# Patient Record
Sex: MALE | Race: WHITE | NOT HISPANIC OR LATINO | Employment: FULL TIME | URBAN - METROPOLITAN AREA
[De-identification: names, ages, dates, MRNs, and addresses within clinical notes are randomized per-mention and may not be internally consistent; named-entity substitution may affect disease eponyms.]

---

## 2018-07-31 ENCOUNTER — APPOINTMENT (OUTPATIENT)
Dept: RADIOLOGY | Facility: MEDICAL CENTER | Age: 23
End: 2018-07-31
Attending: EMERGENCY MEDICINE
Payer: COMMERCIAL

## 2018-07-31 ENCOUNTER — HOSPITAL ENCOUNTER (EMERGENCY)
Facility: MEDICAL CENTER | Age: 23
End: 2018-07-31
Attending: EMERGENCY MEDICINE
Payer: COMMERCIAL

## 2018-07-31 VITALS
OXYGEN SATURATION: 96 % | TEMPERATURE: 97.6 F | SYSTOLIC BLOOD PRESSURE: 99 MMHG | BODY MASS INDEX: 21.38 KG/M2 | DIASTOLIC BLOOD PRESSURE: 68 MMHG | HEIGHT: 67 IN | HEART RATE: 65 BPM | RESPIRATION RATE: 16 BRPM | WEIGHT: 136.24 LBS

## 2018-07-31 DIAGNOSIS — S06.0X0A CONCUSSION WITHOUT LOSS OF CONSCIOUSNESS, INITIAL ENCOUNTER: ICD-10-CM

## 2018-07-31 DIAGNOSIS — S09.90XA CLOSED HEAD INJURY, INITIAL ENCOUNTER: ICD-10-CM

## 2018-07-31 PROCEDURE — 99284 EMERGENCY DEPT VISIT MOD MDM: CPT

## 2018-07-31 PROCEDURE — 70450 CT HEAD/BRAIN W/O DYE: CPT

## 2018-07-31 RX ORDER — ONDANSETRON 4 MG/1
4 TABLET, ORALLY DISINTEGRATING ORAL EVERY 8 HOURS PRN
Qty: 20 TAB | Refills: 0 | Status: SHIPPED | OUTPATIENT
Start: 2018-07-31 | End: 2021-05-04

## 2018-07-31 ASSESSMENT — PAIN SCALES - GENERAL: PAINLEVEL_OUTOF10: 4

## 2018-07-31 NOTE — ED PROVIDER NOTES
"ED Provider Note    Scribed for Jaspal Rojas M.D. by Srinivasa Odell. 7/31/2018  3:10 PM    Means of arrival: Walk-in  History obtained from: Patient  History limited by: None    CHIEF COMPLAINT  Chief Complaint   Patient presents with   • Head Injury     hit head on conveyor belt.    • Headache   • Nausea       HPI  Alden Manrique is a 22 y.o. male who presents to the Emergency Department for evaluation after he stood up and struck his head on a conveyor belt four days ago. He denies loss of consciousness, but experienced fatigue and vision changes. Patient has been nauseous with a headache since the accident. Occupational health requested that he receive a head CT. Patient reports a history of concussions.    REVIEW OF SYSTEMS  Pertinent positives include head injury, fatigue, nausea, vision changes, and headache. Pertinent negatives include no loss of consciousness.    E    PAST MEDICAL HISTORY   Concussions    SURGICAL HISTORY  patient denies any surgical history    SOCIAL HISTORY  Social History   Substance Use Topics   • Smoking status: Former Smoker   • Smokeless tobacco: Never Used   • Alcohol use No      History   Drug Use No       FAMILY HISTORY  History reviewed. No pertinent family history.    CURRENT MEDICATIONS  Home Medications     Reviewed by Ai Pollock R.N. (Registered Nurse) on 07/31/18 at 1507  Med List Status: <None>   Medication Last Dose Status        Patient Nithin Taking any Medications                       ALLERGIES  No Known Allergies    PHYSICAL EXAM  VITAL SIGNS: /72   Pulse (!) 116   Temp 36.4 °C (97.5 °F)   Resp 16   Ht 1.702 m (5' 7\")   Wt 61.8 kg (136 lb 3.9 oz)   SpO2 97%   BMI 21.34 kg/m²     Constitutional: Well developed, Well nourished, No acute distress, Non-toxic appearance.   HENT: Normocephalic, Atraumatic. No hematomas, No hemotympanum. Oropharynx moist.   Eyes: PERRL, EOMI, Conjunctiva normal, No discharge.   CV: Good pulses  Thorax & " Lungs: No respiratory distress.   Skin: Warm, Dry, No erythema, No rash.    Musculoskeletal: No major deformities noted. Paraspinal muscular tenderness, left greater that right. No midline C spine tenderness.  Neurologic: Awake, alert. Moves all extremities spontaneously.  Psychiatric: Affect normal, Mood normal.    LABS  Labs Reviewed - No data to display  All labs reviewed by me.    RADIOLOGY  CT-HEAD W/O   Final Result      No acute intracranial abnormality is identified.        The radiologist's interpretation of all radiological studies have been reviewed by me.    COURSE & MEDICAL DECISION MAKING  Nursing notes, VS, PMSFHx reviewed in chart.    3:10 PM - Patient seen and examined at bedside. Ordered CT head without contrast to evaluate his symptoms.    Decision Making:  Patient with head injury, likely concussion, simple postconcussive syndrome.  Occupational health wanted a CT scan, sent the patient here, CT scan was negative, the patient will follow up with occupational health, have the patient return with any other concerns.     The patient will return for new or worsening symptoms and is stable at the time of discharge.    The patient is referred to a primary physician for blood pressure management, diabetic screening, and for all other preventative health concerns.        DISPOSITION:  Patient will be discharged home in stable condition.    FOLLOW UP:  [facility], Emergency Dept  [address] [id number] [phone]    If symptoms worsen    [facility] [address] [address]  [phone]          OUTPATIENT MEDICATIONS:  Discharge Medication List as of 7/31/2018  4:40 PM      START taking these medications    Details   ondansetron (ZOFRAN ODT) 4 MG TABLET DISPERSIBLE Take 1 Tab by mouth every 8 hours as needed., Disp-20 Tab, R-0, Print Rx Paper               FINAL IMPRESSION  1. Closed head injury, initial encounter     2. Concussion without loss of consciousness, initial encounter          I, Srinivasa Odell (Scribe), am scribing for, and in the presence of, Jaspal Rojas M.D..    Electronically signed by: Srinivasa Odell (Scribe), 7/31/2018    IJaspal M.D. personally performed the services described in this documentation, as scribed by Srinivasa Odell in my presence, and it is both accurate and complete.    The note accurately reflects work and decisions made by me.  Jaspal Rojas  7/31/2018  5:02 PM

## 2018-07-31 NOTE — LETTER
"  FORM C-4:  EMPLOYEE’S CLAIM FOR COMPENSATION/ REPORT OF INITIAL TREATMENT  EMPLOYEE’S CLAIM - PROVIDE ALL INFORMATION REQUESTED   First Name  Alden Last Name  Hilaria Birthdate             Age  1995 22 y.o. Sex  male Claim Number   Home Employee Address  [address]  [facility]                                     Zip  [address] Height  1.702 m (5' 7\") Weight  61.8 kg (136 lb 3.9 oz) [facility]     Mailing Employee Address                           [address]   [place]               Zip  [address] Telephone  [phone] (home)  Primary Language Spoken  ENGLISH   Insurer   Third Party    Employee's Occupation (Job Title) When Injury or Occupational Disease Occurred     Employer's Name  [employer] Telephone  [phone]    Employer Address  [address] [facility] [place] Zip  [address]   Date of Injury  7/29/2018       Hour of Injury  8:00 PM Date Employer Notified  7/29/2018 Last Day of Work after Injury or Occupational Disease  7/29/2018 Supervisor to Whom Injury Reported  Yadiel   Address or Location of Accident (if applicable)  [address]   What were you doing at the time of accident? (if applicable)  Loading packages on conveyor    How did this injury or occupational disease occur? Be specific and answer in detail. Use additional sheet if necessary)  loaded packages onto lower level of conveyor line, and as I came back I lifted my head too fast and hit it on the upper level/   If you believe that you have an occupational disease, when did you first have knowledge of the disability and it relationship to your employment?  n/a Witnesses to the Accident  Emil Ishmeal     Nature of Injury or Occupational Disease  Workers' Compensation  Part(s) of Body Injured or Affected  Skull, N/A, N/A    I certify that the above is true and correct to the best of my knowledge and that I have provided this information in order to " obtain the benefits of [place]  I hereby authorize any physician, chiropractor, surgeon, practitioner, or other person, any hospital, including [facility] hospital, any medical service organization, any insurance company, or other institution or organization to release to each other, any medical or other information, including benefits paid or payable, pertinent to this injury or disease, except information relative to diagnosis, treatment and/or counseling for AIDS, psychological conditions, alcohol or controlled substances, for which I must give specific authorization.  A Photostat of this authorization shall be as valid as the original.   Date  07/31/2018 [facility] Employee’s Signature   THIS REPORT MUST BE COMPLETED AND MAILED WITHIN 3 WORKING DAYS OF TREATMENT   Place  [facility], EMERGENCY DEPT  Name of Facility   [facility]   Date  7/31/2018 Diagnosis  (S09.90XA) Closed head injury, initial encounter  (S06.0X0A) Concussion without loss of consciousness, initial encounter Is there evidence the injured employee was under the influence of alcohol and/or another controlled substance at the time of accident?   Hour  4:35 PM Description of Injury or Disease  Closed head injury, initial encounter  Concussion without loss of consciousness, initial encounter No   Treatment  none  Have you advised the patient to remain off work five days or more?         No   X-Ray Findings  Negative   If Yes   From Date    To Date      From information given by the employee, together with medical evidence, can you directly connect this injury or occupational disease as job incurred?  Yes If No, is the employee capable of: Full Duty  No Modified Duty  No   Is additional medical care by a physician indicated?  Yes If Modified Duty, Specify  "any Limitations / Restrictions        Do you know of any previous injury or disease contributing to this condition or occupational disease?  No   Date  7/31/2018 Print Doctor’s Name  Jaspal Rojas I certify the employer’s copy of this form was mailed on:   Address  [address] [address] [phone] Insurer’s Use Only   [facility]  [address]    Provider’s Tax ID Number  [id number] Telephone  Dept: [phone]    Doctor’s Signature  e-JASPAL Adam M.D. Degree   M.D.    Original - TREATING PHYSICIAN OR CHIROPRACTOR   Pg 2-Insurer/TPA   Pg 3-Employer   Pg 4-Employee                                                                                                  Form C-4 (rev01/03)   BRIEF DESCRIPTION OF RIGHTS AND BENEFITS  (Pursuant to [place] 616C.050)  Notice of Injury or Occupational Disease (Incident Report Form C-1): If an injury or occupational disease (OD) arises out of and in the course of employment, you must provide written notice to your employer as soon as practicable, but no later than 7 days after the accident or OD. Your employer shall maintain a sufficient supply of the required forms.  Claim for Compensation (Form C-4): If medical treatment is sought, the form C-4 is available at the place of initial treatment. A completed \"Claim for Compensation\" (Form C-4) must be filed within 90 days after an accident or OD. The treating physician or chiropractor must, within 3 working days after treatment, complete and mail to the employer, the employer's insurer and third-party , the Claim for Compensation.  Medical Treatment: If you require medical treatment for your on-the-job injury or OD, you may be required to select a physician or chiropractor from a list provided by your workers’ compensation insurer, if it has contracted with an Organization for Managed Care (MCO) or Preferred Provider Organization (PPO) or providers of health care. If your " employer has not entered into a contract with an MCO or PPO, you may select a physician or chiropractor from the Panel of Physicians and Chiropractors. Any medical costs related to your industrial injury or OD will be paid by your insurer.  Temporary Total Disability (TTD): If your doctor has certified that you are unable to work for a period of at least 5 consecutive days, or 5 cumulative days in a 20-day period, or places restrictions on you that your employer does not accommodate, you may be entitled to TTD compensation.  Temporary Partial Disability (TPD): If the wage you receive upon reemployment is less than the compensation for TTD to which you are entitled, the insurer may be required to pay you TPD compensation to make up the difference. TPD can only be paid for a maximum of 24 months.  Permanent Partial Disability (PPD): When your medical condition is stable and there is an indication of a PPD as a result of your injury or OD, within 30 days, your insurer must arrange for an evaluation by a rating physician or chiropractor to determine the degree of your PPD. The amount of your PPD award depends on the date of injury, the results of the PPD evaluation and your age and wage.  Permanent Total Disability (PTD): If you are medically certified by a treating physician or chiropractor as permanently and totally disabled and have been granted a PTD status by your insurer, you are entitled to receive monthly benefits not to exceed 66 2/3% of your average monthly wage. The amount of your PTD payments is subject to reduction if you previously received a PPD award.  ocational Rehabilitation Services: You may be eligible for vocational rehabilitation services if you are unable to return to the job due to a permanent physical impairment or permanent restrictions as a result of your injury or occupational disease.  Transportation and Per April Reimbursement: You may be eligible for travel expenses and per april associated  with medical treatment.  Reopening: You may be able to reopen your claim if your condition worsens after claim closure.  Appeal Process: If you disagree with a written determination issued by the insurer or the insurer does not respond to your request, you may appeal to the Department of Administration, , by following the instructions contained in your determination letter. You must appeal the determination within 70 days from the date of the determination letter at [address], [address], [place], [place] [address], or [address], [address], [place], [place] [address]. If you disagree with the  decision, you may appeal to the Department of Administration, . You must file your appeal within 30 days from the date of the  decision letter at [address], [address], [place], [place] [address], or [address], [place], [place] [address]. If you disagree with a decision of an , you may file a petition for judicial review with the District Court. You must do so within 30 days of the Appeal Officer’s decision. You may be represented by an  at your own expense or you may contact the [facility] for possible representation.  [place]  for Injured Workers (NAIW): If you disagree with a  decision, you may request that NAIW represent you without charge at an  Hearing. For information regarding denial of benefits, you may contact the [facility] at: [address], [address], [place] [address], [phone], or [address], [address], [place] [address], [phone]  To File a Complaint with the Division: If you wish to file a complaint with the  of the Division of Industrial Relations (DIR), please contact the Workers’ Compensation Section, [address], [address], [place], [place] [address], telephone [phone], or [address], [address], [place], [place] [address], telephone [phone]) [phone].  For assistance with Workers’ Compensation Issues: you may contact the Office of the Governor Consumer Health Assistance, [address] [place] [address], [address], [place], [place] [address], Toll Free 1-406.428.2344, Web site: [email or web address]., E-mail [email or web address].                                                                                                                                                                               __________________________________________________________________                                    _07/31/2018_            Employee Name / Signature                                                                                                                            Date                                       D-2 (rev. 10/07)

## 2018-07-31 NOTE — ED TRIAGE NOTES
.  Chief Complaint   Patient presents with   • Head Injury     hit head on conveyor belt.    • Headache   • Nausea     Intermittent headaches, nausea when headaches start. Sent by work for CT d/t continued pain. Injury occurred Friday. No loc.

## 2018-07-31 NOTE — ED NOTES
Pt admits readiness for discharge.  Patient given discharge instructions and verbalized understanding.  Vital signs are stable.  Pt denies any further needs.

## 2018-07-31 NOTE — ED NOTES
Pt ambs to room, reports that because he still had pain he was directed to occupational health for eval today and they stated pt need to go to ER for CT scan. Reports occasional nausea, no vomiting, no dizziness. MERVAT. Chart up for ERP

## 2020-11-20 ENCOUNTER — HOSPITAL ENCOUNTER (OUTPATIENT)
Dept: LAB | Facility: MEDICAL CENTER | Age: 25
End: 2020-11-20
Attending: FAMILY MEDICINE
Payer: COMMERCIAL

## 2020-11-20 PROCEDURE — C9803 HOPD COVID-19 SPEC COLLECT: HCPCS

## 2020-11-20 PROCEDURE — U0003 INFECTIOUS AGENT DETECTION BY NUCLEIC ACID (DNA OR RNA); SEVERE ACUTE RESPIRATORY SYNDROME CORONAVIRUS 2 (SARS-COV-2) (CORONAVIRUS DISEASE [COVID-19]), AMPLIFIED PROBE TECHNIQUE, MAKING USE OF HIGH THROUGHPUT TECHNOLOGIES AS DESCRIBED BY CMS-2020-01-R: HCPCS

## 2020-11-21 LAB — COVID ORDER STATUS COVID19: NORMAL

## 2020-11-22 LAB
SARS-COV-2 RNA RESP QL NAA+PROBE: NOTDETECTED
SPECIMEN SOURCE: NORMAL

## 2021-01-20 ENCOUNTER — APPOINTMENT (OUTPATIENT)
Dept: RADIOLOGY | Facility: MEDICAL CENTER | Age: 26
End: 2021-01-20
Attending: EMERGENCY MEDICINE
Payer: COMMERCIAL

## 2021-01-20 ENCOUNTER — HOSPITAL ENCOUNTER (EMERGENCY)
Facility: MEDICAL CENTER | Age: 26
End: 2021-01-20
Attending: EMERGENCY MEDICINE
Payer: COMMERCIAL

## 2021-01-20 VITALS
DIASTOLIC BLOOD PRESSURE: 64 MMHG | WEIGHT: 146.83 LBS | RESPIRATION RATE: 18 BRPM | HEART RATE: 84 BPM | HEIGHT: 67 IN | TEMPERATURE: 99.8 F | OXYGEN SATURATION: 98 % | BODY MASS INDEX: 23.04 KG/M2 | SYSTOLIC BLOOD PRESSURE: 119 MMHG

## 2021-01-20 DIAGNOSIS — S20.229A: ICD-10-CM

## 2021-01-20 DIAGNOSIS — S06.0X0A CONCUSSION WITHOUT LOSS OF CONSCIOUSNESS, INITIAL ENCOUNTER: ICD-10-CM

## 2021-01-20 DIAGNOSIS — S10.93XA CONTUSION OF NECK, INITIAL ENCOUNTER: ICD-10-CM

## 2021-01-20 PROCEDURE — 99283 EMERGENCY DEPT VISIT LOW MDM: CPT

## 2021-01-20 PROCEDURE — 72040 X-RAY EXAM NECK SPINE 2-3 VW: CPT

## 2021-01-20 PROCEDURE — 72070 X-RAY EXAM THORAC SPINE 2VWS: CPT

## 2021-01-20 RX ORDER — ACETAMINOPHEN 500 MG
1000 TABLET ORAL EVERY 6 HOURS PRN
COMMUNITY

## 2021-01-20 NOTE — Clinical Note
Alden Manrique was seen and treated in our emergency department on 1/20/2021.  He may return to work on 01/21/2021.  Limited weight lifting for one week,  no more than 10 lbs      If you have any questions or concerns, please don't hesitate to call.      Ariadne Griffin M.D.

## 2021-01-21 NOTE — ED TRIAGE NOTES
Tripped and hit shelf with head last night at store. No LOC No vomiting. C/o neck pain and had loss of vision for a few seconds last night  Hard C collar placed in triage.

## 2021-01-21 NOTE — ED NOTES
Med rec updated and complete  Allergies reviewed  Pt reports no prescription medications or vitamins    Pt reports no antibiotics in the last 2 weeks

## 2021-01-21 NOTE — ED NOTES
"Pt. Reports slip and fall last night, hitting his head on a shelf in a store. Denies LOC, but states \"I went home and had some conversations with my girlfriend that I don't remember, that's why I came in\". Denies vomiting, or numbness/tingling to extremities. Denies other injury at this time.   "

## 2021-01-21 NOTE — ED PROVIDER NOTES
"ED Provider Note    CHIEF COMPLAINT  Chief Complaint   Patient presents with   • Head Injury     Tripped and head hit shelf in store  No LOC No vomiting  Pain to base of neck       HPI  Alden Manrique is a 25 y.o. male who presents to the emergency department approximately 24 hours after traumatic event.  The patient states he tripped and hit his head into a very heavy shelf in the store and also hit his upper back and upper neck.  He did not lose consciousness he woke up with a mild headache today but no dizziness nausea vomiting and the headache resolved Tylenol.  Patient is concerned as he had continued pain in his lower neck and upper back that radiates to both shoulder blades.  There is no associated weakness numbness or tingling but his girlfriend insisted he come in and get checked.  He is currently about a 4 out of 10 worse with movement    REVIEW OF SYSTEMS  Positives as above. Pertinent negatives include loss of consciousness nausea vomiting weakness numbness tingling easy bleeding or bruising chest pain shortness of breath  All other review of systems are negative    PAST MEDICAL HISTORY       SOCIAL HISTORY  Social History     Tobacco Use   • Smoking status: Former Smoker   • Smokeless tobacco: Never Used   Substance and Sexual Activity   • Alcohol use: No   • Drug use: No   • Sexual activity: Not on file       SURGICAL HISTORY  patient denies any surgical history    CURRENT MEDICATIONS  Home Medications     Reviewed by Ortega Roach (Pharmacy Tech) on 01/20/21 at 1706  Med List Status: Complete   Medication Last Dose Status   acetaminophen (TYLENOL) 500 MG Tab 1/19/2021 Active   ondansetron (ZOFRAN ODT) 4 MG TABLET DISPERSIBLE Not Taking Active                ALLERGIES  Allergies   Allergen Reactions   • Trace Metals Rash     Rash       PHYSICAL EXAM  VITAL SIGNS: /73   Pulse 91   Temp 37.7 °C (99.8 °F) (Temporal)   Resp 16   Ht 1.702 m (5' 7\")   Wt 66.6 kg (146 lb 13.2 oz)   " SpO2 96%   BMI 23.00 kg/m²    Pulse ox interpretation: I interpret this pulse ox as normal.  Constitutional: Alert in no apparent distress.  HENT: Normocephalic, Atraumatic, MMM, no scalp contusions, midline cervical mild tenderness at the base of the C-spine that is midline without step-offs or swelling and at the top of the T-spine mild bilateral muscular tenderness  Eyes: PERound. Conjunctiva normal, non-icteric.   Heart: Regular rate and rhythm, no murmurs.    Lungs: Clear to auscultation bilaterally. No resp distress, breath sounds equal  Abdomen: Non-tender, non-distended, normal bowel sounds  Skin: Warm, Dry, No erythema, No rash.   Neurologic: Alert and oriented, Symmetric smile, eyes shut tight bilaterally, forehead wrinkles bilaterally, sensation intact to light touch bilateral face, tongue midline, head turn and shoulder shrug with full strength. Hearing intact grossly bilaterally. 5/5  strength bilaterally, 5/5 tricep and bicep strength bilaterally. Sensation intact to light touch r, m, u, axillary nerves bilaterally. 5/5 strength quadricep, plantarflexion/dorsiflexion/extensor hallicus longus bilaterally. Sensation intact to light touch bilateral lower extremities in all nerve distributions.  Intact finger-to-nose, negative rhomberg, no pronator drift        DIFFERENTIAL DIAGNOSIS AND WORK UP PLAN    This is a 25 y.o. male who presents with mostly upper neck and upper back pain after a traumatic event yesterday was in a fall from a great height his head is currently cleared by [facility] criteria but does have some midline tenderness he was placed in a collar on arrival for safety most likely anticipate bone contusion and strain or sprain.  He does not wish for anything for pain at this moment we will perform some plain films    Pertinent Lab Findings  Labs Reviewed - No data to display    Radiology  DX-CERVICAL SPINE-2 OR 3 VIEWS   Final Result         No acute fracture or malalignment.     "  DX-THORACIC SPINE-2 VIEWS   Final Result         No acute osseous abnormality.        The radiologist's interpretation of all radiological studies have been reviewed by me.    COURSE & MEDICAL DECISION MAKING  Pertinent Labs & Imaging studies reviewed. (See chart for details)    5:40 PM  I reassessed patient the bedside is resting comfortably we discussed rice treatment at home Tylenol and NSAIDs as needed light weightlifting and work as he states he lifts very heavy things in a regular basis until his pain is improved.  Return to the ED for any new or worsening issues he understands feels comfortable at home.    /64   Pulse 84   Temp 37.7 °C (99.8 °F) (Temporal)   Resp 18   Ht 1.702 m (5' 7\")   Wt 66.6 kg (146 lb 13.2 oz)   SpO2 98%   BMI 23.00 kg/m²       I verified that the patient was wearing a mask and I was wearing appropriate PPE every time I entered the room. The patient's mask was on the patient at all times during my encounter except for a brief view of the oropharynx.    The patient will return for new or worsening symptoms and is stable at the time of discharge.    The patient is referred to a primary physician for blood pressure management, diabetic screening, and for all other preventative health concerns.    DISPOSITION:  Patient will be discharged home in stable condition.    FOLLOW UP:  [facility], Emergency Dept  [address]  [place] [address]  [phone]    If symptoms worsen      OUTPATIENT MEDICATIONS:  Discharge Medication List as of 1/20/2021  5:51 PM            FINAL IMPRESSION  1. Concussion without loss of consciousness, initial encounter     2. Contusion of neck, initial encounter     3. Contusion of upper back, initial encounter                Electronically signed by: Ariadne Griffin M.D., 1/20/2021 5:11 PM    This dictation has been created using voice recognition software and/or scribes. The accuracy of the dictation is limited " by the abilities of the software and the expertise of the scribes. I expect there may be some errors of grammar and possibly content. I made every attempt to manually correct the errors within my dictation. However, errors related to voice recognition software and/or scribes may still exist and should be interpreted within the appropriate context.

## 2021-05-04 ENCOUNTER — OFFICE VISIT (OUTPATIENT)
Dept: URGENT CARE | Facility: CLINIC | Age: 26
End: 2021-05-04
Payer: COMMERCIAL

## 2021-05-04 VITALS
DIASTOLIC BLOOD PRESSURE: 90 MMHG | RESPIRATION RATE: 16 BRPM | HEART RATE: 78 BPM | HEIGHT: 66 IN | OXYGEN SATURATION: 97 % | BODY MASS INDEX: 24.11 KG/M2 | WEIGHT: 150 LBS | TEMPERATURE: 98.5 F | SYSTOLIC BLOOD PRESSURE: 128 MMHG

## 2021-05-04 DIAGNOSIS — J30.2 CHRONIC SEASONAL ALLERGIC RHINITIS: ICD-10-CM

## 2021-05-04 DIAGNOSIS — J01.40 ACUTE NON-RECURRENT PANSINUSITIS: ICD-10-CM

## 2021-05-04 PROCEDURE — 99204 OFFICE O/P NEW MOD 45 MIN: CPT | Performed by: PHYSICIAN ASSISTANT

## 2021-05-04 RX ORDER — AMOXICILLIN AND CLAVULANATE POTASSIUM 875; 125 MG/1; MG/1
1 TABLET, FILM COATED ORAL 2 TIMES DAILY
Qty: 14 TABLET | Refills: 0 | Status: SHIPPED | OUTPATIENT
Start: 2021-05-04 | End: 2021-05-11

## 2021-05-04 RX ORDER — ALBUTEROL SULFATE 90 UG/1
2 AEROSOL, METERED RESPIRATORY (INHALATION) EVERY 6 HOURS PRN
Qty: 8.5 G | Refills: 0 | Status: SHIPPED | OUTPATIENT
Start: 2021-05-04

## 2021-05-04 ASSESSMENT — ENCOUNTER SYMPTOMS
COUGH: 1
HOARSE VOICE: 1
SINUS PRESSURE: 1
SHORTNESS OF BREATH: 1
CHILLS: 0

## 2021-05-04 NOTE — PROGRESS NOTES
"Subjective:   Alden Manrique is a 25 y.o. male who presents for Seasonal Allergies (x 2 days ), Shortness of Breath, and Headache        Sinusitis  This is a new problem. Episode onset: 2-3 weeks  The problem is unchanged. There has been no fever. Associated symptoms include congestion, coughing, a hoarse voice, shortness of breath (intermittent) and sinus pressure. Pertinent negatives include no chills. Treatments tried: zyretc      Hx of chronic seasonal allergies that cause asthma symptoms including wheezing, dry cough, SOB. He has not been dx with asthma previously but has been treated with albuterol inhaler with resolution of sx.     Review of Systems   Constitutional: Negative for chills.   HENT: Positive for congestion, hoarse voice and sinus pressure.    Respiratory: Positive for cough and shortness of breath (intermittent).        PMH:  has no past medical history on file.  MEDS:   Current Outpatient Medications:   •  amoxicillin-clavulanate (AUGMENTIN) 875-125 MG Tab, Take 1 tablet by mouth 2 times a day for 7 days., Disp: 14 tablet, Rfl: 0  •  albuterol 108 (90 Base) MCG/ACT Aero Soln inhalation aerosol, Inhale 2 Puffs every 6 hours as needed for Shortness of Breath., Disp: 8.5 g, Rfl: 0  •  acetaminophen (TYLENOL) 500 MG Tab, Take 1,000 mg by mouth every 6 hours as needed for Moderate Pain., Disp: , Rfl:   ALLERGIES:   Allergies   Allergen Reactions   • Trace Metals Rash     Rash     SURGHX: History reviewed. No pertinent surgical history.  SOCHX:  reports that he has quit smoking. He has never used smokeless tobacco. He reports current alcohol use. He reports that he does not use drugs.  History reviewed. No pertinent family history.     Objective:   /90   Pulse 78   Temp 36.9 °C (98.5 °F) (Temporal)   Resp 16   Ht 1.676 m (5' 6\")   Wt 68 kg (150 lb)   SpO2 97%   BMI 24.21 kg/m²     Physical Exam  Vitals reviewed.   Constitutional:       General: He is not in acute distress.     " Appearance: Normal appearance. He is well-developed. He is not ill-appearing.   HENT:      Head: Normocephalic and atraumatic.      Right Ear: Tympanic membrane and external ear normal.      Left Ear: Tympanic membrane and external ear normal.      Nose: Congestion present.      Mouth/Throat:      Mouth: Mucous membranes are moist.      Pharynx: Posterior oropharyngeal erythema present.   Eyes:      Conjunctiva/sclera: Conjunctivae normal.      Pupils: Pupils are equal, round, and reactive to light.   Neck:      Trachea: No tracheal deviation.   Cardiovascular:      Rate and Rhythm: Normal rate and regular rhythm.   Pulmonary:      Effort: Pulmonary effort is normal. No respiratory distress.      Breath sounds: Normal breath sounds. No stridor. No wheezing or rhonchi.   Musculoskeletal:      Cervical back: Normal range of motion and neck supple. No rigidity.   Lymphadenopathy:      Cervical: No cervical adenopathy.   Skin:     General: Skin is warm and dry.      Capillary Refill: Capillary refill takes less than 2 seconds.   Neurological:      General: No focal deficit present.      Mental Status: He is alert and oriented to person, place, and time.   Psychiatric:         Mood and Affect: Mood normal.         Behavior: Behavior normal.           Assessment/Plan:     1. Acute non-recurrent pansinusitis  amoxicillin-clavulanate (AUGMENTIN) 875-125 MG Tab   2. Chronic seasonal allergic rhinitis  albuterol 108 (90 Base) MCG/ACT Aero Soln inhalation aerosol     Supportive care reviewed. Start zyrtec-d, flonase, augmentin. He was given a refill for albuterol. We discussed importance of f/u with pcp.        If symptoms worsen or persist patient can return to clinic for reevaluation.  Red flags and emergency room precautions discussed. Side effects of medication discussed. Patient confirmed understanding of information.    Please note that this dictation was created using voice recognition software. I have made every  reasonable attempt to correct obvious errors, but I expect that there are errors of grammar and possibly content that I did not discover before finalizing the note.

## 2021-05-04 NOTE — LETTER
May 4, 2021         Patient: Alden Manrique   YOB: 1995   Date of Visit: 5/4/2021           To Whom it May Concern:    Alden Manrique was seen in my clinic on 5/4/2021. He may return to work on 5/6/21 or sooner if symptoms improve.    If you have any questions or concerns, please don't hesitate to call.        Sincerely,           Ai Olguin P.A.-C.  Electronically Signed

## 2021-11-17 ENCOUNTER — OFFICE VISIT (OUTPATIENT)
Dept: URGENT CARE | Facility: CLINIC | Age: 26
End: 2021-11-17
Payer: COMMERCIAL

## 2021-11-17 VITALS
SYSTOLIC BLOOD PRESSURE: 110 MMHG | BODY MASS INDEX: 25.9 KG/M2 | WEIGHT: 165 LBS | HEIGHT: 67 IN | TEMPERATURE: 98.5 F | OXYGEN SATURATION: 97 % | HEART RATE: 75 BPM | RESPIRATION RATE: 16 BRPM | DIASTOLIC BLOOD PRESSURE: 64 MMHG

## 2021-11-17 DIAGNOSIS — R10.9 BELLY PAIN: ICD-10-CM

## 2021-11-17 LAB
APPEARANCE UR: CLEAR
BILIRUB UR STRIP-MCNC: NORMAL MG/DL
COLOR UR AUTO: YELLOW
GLUCOSE UR STRIP.AUTO-MCNC: NORMAL MG/DL
KETONES UR STRIP.AUTO-MCNC: NORMAL MG/DL
LEUKOCYTE ESTERASE UR QL STRIP.AUTO: NORMAL
NITRITE UR QL STRIP.AUTO: NORMAL
PH UR STRIP.AUTO: 5.5 [PH] (ref 5–8)
PROT UR QL STRIP: NORMAL MG/DL
RBC UR QL AUTO: NORMAL
SP GR UR STRIP.AUTO: 1.02
UROBILINOGEN UR STRIP-MCNC: 0.2 MG/DL

## 2021-11-17 PROCEDURE — 99213 OFFICE O/P EST LOW 20 MIN: CPT | Performed by: FAMILY MEDICINE

## 2021-11-17 PROCEDURE — 81002 URINALYSIS NONAUTO W/O SCOPE: CPT | Performed by: FAMILY MEDICINE

## 2021-11-17 RX ORDER — HYOSCYAMINE SULFATE 0.125 MG
125 TABLET ORAL EVERY 6 HOURS PRN
Qty: 15 TABLET | Refills: 0 | Status: SHIPPED | OUTPATIENT
Start: 2021-11-17 | End: 2023-07-27

## 2021-11-17 ASSESSMENT — ENCOUNTER SYMPTOMS
ABDOMINAL PAIN: 1
BACK PAIN: 1

## 2021-11-17 NOTE — LETTER
November 17, 2021         Patient: Alden Manrique   YOB: 1995   Date of Visit: 11/17/2021           To Whom it May Concern:    Alden Manrique was seen in my clinic on 11/17/2021. He may return to work tomorrow.    If you have any questions or concerns, please don't hesitate to call.        Sincerely,           Mor Castorena M.D.  Electronically Signed

## 2021-11-17 NOTE — PROGRESS NOTES
"Subjective     Alden Manrique is a 26 y.o. male who presents with LLQ Pain (sharp/throbbing pain, worse when standing, started yesterday midday ) and Low Back Pain (Left lower )    - This is a pleasant and nontoxic appearing 26 y.o. male with c/o yesterday noticed some pain to LLQ region. Has been mild and on/off, feels like gas. A little pain Lt lower back. No NVFC, eating/drinking well, no stool changes, no blood stools, no urinary symptoms. Pain isnt present at moment now. Some OTC gas ex yesterday seemed to help.       ALLERGIES:  Trace metals     PMH:  History reviewed. No pertinent past medical history.     PSH:  History reviewed. No pertinent surgical history.    MEDS:    Current Outpatient Medications:   •  Loratadine (CLARITIN PO), Take  by mouth., Disp: , Rfl:   •  hyoscyamine (LEVSIN) 0.125 MG tablet, Take 1 Tablet by mouth every 6 hours as needed for Cramping., Disp: 15 Tablet, Rfl: 0  •  albuterol 108 (90 Base) MCG/ACT Aero Soln inhalation aerosol, Inhale 2 Puffs every 6 hours as needed for Shortness of Breath., Disp: 8.5 g, Rfl: 0  •  acetaminophen (TYLENOL) 500 MG Tab, Take 1,000 mg by mouth every 6 hours as needed for Moderate Pain., Disp: , Rfl:     ** I have documented what I find to be significant in regards to past medical, social, family and surgical history  in my HPI or under PMH/PSH/FH review section, otherwise it is noncontributory **             HPI    Review of Systems   Gastrointestinal: Positive for abdominal pain.   Musculoskeletal: Positive for back pain.   All other systems reviewed and are negative.      Objective     /64   Pulse 75   Temp 36.9 °C (98.5 °F) (Temporal)   Resp 16   Ht 1.702 m (5' 7\")   Wt 74.8 kg (165 lb)   SpO2 97%   BMI 25.84 kg/m²      Physical Exam  Vitals and nursing note reviewed.   Constitutional:       General: He is not in acute distress.     Appearance: Normal appearance. He is well-developed.   HENT:      Head: Normocephalic and atraumatic. "      Mouth/Throat:      Mouth: Mucous membranes are moist.      Pharynx: Oropharynx is clear.   Eyes:      General: No scleral icterus.  Cardiovascular:      Heart sounds: Normal heart sounds. No murmur heard.      Pulmonary:      Effort: Pulmonary effort is normal. No respiratory distress.   Abdominal:      General: Abdomen is flat. Bowel sounds are normal. There is no distension.      Palpations: Abdomen is soft.      Tenderness: There is abdominal tenderness ( very mild LLQ). There is no guarding or rebound.   Skin:     Coloration: Skin is not jaundiced or pale.   Neurological:      Mental Status: He is alert.      Motor: No abnormal muscle tone.   Psychiatric:         Mood and Affect: Mood normal.         Behavior: Behavior normal.           Assessment & Plan       1. Belly pain  POCT Urinalysis    hyoscyamine (LEVSIN) 0.125 MG tablet     * nonspecific, trial of above and liquid diet today. If not improving in 24hrs or feels is getting worse then go to ER      - Dx, plan & d/c instructions discussed   - Rest, stay hydrated,   - E.R. precautions discussed     Asked to kindly follow up with their PCP's office in 2-3 days for a recheck, ER if not improving or feeling/getting worse.    Any realistic side effects of medications that may have been given today reviewed.     Patient left in stable condition     POCT results reviewed/discussed

## 2023-05-24 ENCOUNTER — OCCUPATIONAL MEDICINE (OUTPATIENT)
Dept: URGENT CARE | Facility: CLINIC | Age: 28
End: 2023-05-24
Payer: COMMERCIAL

## 2023-05-24 VITALS
SYSTOLIC BLOOD PRESSURE: 120 MMHG | TEMPERATURE: 98.3 F | HEIGHT: 66 IN | BODY MASS INDEX: 27.48 KG/M2 | WEIGHT: 171 LBS | RESPIRATION RATE: 18 BRPM | OXYGEN SATURATION: 96 % | HEART RATE: 58 BPM | DIASTOLIC BLOOD PRESSURE: 60 MMHG

## 2023-05-24 DIAGNOSIS — S00.83XA CONTUSION OF FACE, INITIAL ENCOUNTER: ICD-10-CM

## 2023-05-24 DIAGNOSIS — S05.01XA CONJUNCTIVAL ABRASION, RIGHT, INITIAL ENCOUNTER: ICD-10-CM

## 2023-05-24 DIAGNOSIS — S00.531A CONTUSION OF LIP, INITIAL ENCOUNTER: ICD-10-CM

## 2023-05-24 DIAGNOSIS — Z02.6 ENCOUNTER RELATED TO WORKER'S COMPENSATION CLAIM: ICD-10-CM
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
## 2023-05-24 PROCEDURE — 99214 OFFICE O/P EST MOD 30 MIN: CPT | Performed by: PHYSICIAN ASSISTANT

## 2023-05-24 RX ORDER — OFLOXACIN 3 MG/ML
1 SOLUTION/ DROPS OPHTHALMIC 4 TIMES DAILY
Qty: 10 ML | Refills: 0 | Status: SHIPPED | OUTPATIENT
Start: 2023-05-24 | End: 2023-07-27
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
## 2023-07-27 ENCOUNTER — TELEMEDICINE (OUTPATIENT)
Dept: TELEHEALTH | Facility: TELEMEDICINE | Age: 28
End: 2023-07-27
Payer: COMMERCIAL

## 2023-07-27 DIAGNOSIS — J02.9 PHARYNGITIS, UNSPECIFIED ETIOLOGY: ICD-10-CM

## 2023-07-27 DIAGNOSIS — J01.90 ACUTE NON-RECURRENT SINUSITIS, UNSPECIFIED LOCATION: ICD-10-CM

## 2023-07-27 PROCEDURE — 99213 OFFICE O/P EST LOW 20 MIN: CPT | Mod: 95 | Performed by: NURSE PRACTITIONER

## 2023-07-27 NOTE — LETTER
July 27, 2023         Patient: Alden Manrique   YOB: 1995   Date of Visit: 7/27/2023           To Whom it May Concern:    Alden Manrique was seen in my clinic on 7/27/2023. He may return to work on 07/28/2023.    If you have any questions or concerns, please don't hesitate to call.        Sincerely,           BRIAN Hector.  Electronically Signed

## 2023-07-27 NOTE — PATIENT INSTRUCTIONS
-Nasal spray and allergy medications as directed (Zyrtec or Loratadine).  -You may try saline irrigation or neti pot.   -Drink plenty of fluids.  -Ibuprofen or Tylenol as directed for pain.   -Warm compress to sinuses.  -Warm salt water gargles.  -OTC Throat lozenges or spray (Cepacol).    Follow up with primary care provider. Urgently for worsening symptoms, persistent throat pain,, difficulty swallowing, shortness of breath, persistent fevers, facial swelling, visual changes, weakness, elevated heart rate, stiff neck, symptoms last longer than 10 days, or any other concerns.

## 2023-07-27 NOTE — PROGRESS NOTES
Virtual Visit: Established Patient   This visit was conducted via Zoom using secure and encrypted videoconferencing technology.   The patient was in their home in the [facility].    The patient's identity was confirmed and verbal consent was obtained for this virtual visit.    Subjective:   CC:   Chief Complaint   Patient presents with    Pharyngitis    Sinusitis     Alden Manrique is a 27 y.o. male presenting for evaluation and management of:    Started 2 days ago. Has a history of annual nasal congestion and sinus issues; associated with allergies. Low grade fever yesterday. No body aches or chills. Migraine headache behind the eyes with pressure. Denies cough. Post nasal drip. Mild sore throat. Nausea. No diarrhea or vomiting. Taking Zyrtec. Missed work today. Exposed to strep at work. Negative home covid test this morning.  Needs a work note.    ROS   Note HPI. Environmental allergies.     Current medicines (including changes today)  Current Outpatient Medications   Medication Sig Dispense Refill    albuterol 108 (90 Base) MCG/ACT Aero Soln inhalation aerosol Inhale 2 Puffs every 6 hours as needed for Shortness of Breath. 8.5 g 0    acetaminophen (TYLENOL) 500 MG Tab Take 1,000 mg by mouth every 6 hours as needed for Moderate Pain.       No current facility-administered medications for this visit.       There are no problems to display for this patient.       Objective:   There were no vitals taken for this visit.  RR 16    Physical Exam:  Constitutional: Alert, no distress, well-groomed.  Skin: No rashes in visible areas.  Eye: Round. Conjunctiva clear, lids normal. No icterus.   ENMT: Nasal congestion. Lips pink. Clear speech.  Respiratory: Unlabored respiratory effort, no cough or audible wheeze  Psych: Alert and oriented x3, normal affect and mood.     Assessment and Plan:   The following treatment plan was discussed:     1. Acute non-recurrent sinusitis, unspecified location    2. Pharyngitis,  unspecified etiology  - POCT CEPHEID GROUP A STREP - PCR    -Nasal spray and allergy medications as directed (Zyrtec or Loratadine).  -You may try saline irrigation or neti pot.   -Drink plenty of fluids.  -Ibuprofen or Tylenol as directed for pain.   -Warm compress to sinuses.  -Warm salt water gargles.  -OTC Throat lozenges or spray (Cepacol).    Follow up with primary care provider. Urgently for worsening symptoms, persistent throat pain,, difficulty swallowing, shortness of breath, persistent fevers, facial swelling, visual changes, weakness, elevated heart rate, stiff neck, symptoms last longer than 10 days, or any other concerns.    Follow-up: Return if symptoms worsen or fail to improve.    Acute symptoms x 2 days. Had negative home COVID testing. Hx of sinusitis. Likely viral etiology, with allergy contribution. Advised continued symptomatic care, with f/u for persistent or worsening symptoms.

## 2023-11-15 ENCOUNTER — APPOINTMENT (OUTPATIENT)
Dept: TELEHEALTH | Facility: TELEMEDICINE | Age: 28
End: 2023-11-15

## 2023-11-15 ENCOUNTER — APPOINTMENT (OUTPATIENT)
Dept: URGENT CARE | Facility: CLINIC | Age: 28
End: 2023-11-15

## 2023-11-17 ENCOUNTER — OFFICE VISIT (OUTPATIENT)
Dept: URGENT CARE | Facility: CLINIC | Age: 28
End: 2023-11-17

## 2023-11-17 VITALS
TEMPERATURE: 97.6 F | RESPIRATION RATE: 16 BRPM | HEART RATE: 70 BPM | BODY MASS INDEX: 26.68 KG/M2 | DIASTOLIC BLOOD PRESSURE: 80 MMHG | WEIGHT: 170 LBS | HEIGHT: 67 IN | SYSTOLIC BLOOD PRESSURE: 120 MMHG | OXYGEN SATURATION: 97 %

## 2023-11-17 DIAGNOSIS — U07.1 COVID-19 VIRUS INFECTION: ICD-10-CM

## 2023-11-17 PROCEDURE — 3074F SYST BP LT 130 MM HG: CPT | Performed by: PHYSICIAN ASSISTANT

## 2023-11-17 PROCEDURE — 99213 OFFICE O/P EST LOW 20 MIN: CPT | Performed by: PHYSICIAN ASSISTANT

## 2023-11-17 PROCEDURE — 3079F DIAST BP 80-89 MM HG: CPT | Performed by: PHYSICIAN ASSISTANT

## 2023-11-17 ASSESSMENT — ENCOUNTER SYMPTOMS
VOMITING: 1
CHILLS: 1
SORE THROAT: 1
MYALGIAS: 1
COUGH: 1
SHORTNESS OF BREATH: 0
FEVER: 1
HEADACHES: 1
NAUSEA: 1

## 2023-11-17 NOTE — PROGRESS NOTES
Subjective     Hamlet Manrique is a 28 y.o. male who presents with Cough (X 7 days, cough, tested positive for covid on Monday, requesting a work note.)    HPI:  Alden Manrique is a 28 y.o. male who presents today for cough.  Patient reports that he tested positive on a home COVID test on Monday of this week.  He started to have symptoms 1 day prior.  He notes that he has been testing negative since that initial day but he was having fever, chills, body aches, congestion, cough, sore throat.  He says that most of his symptoms have improved and he is feeling a lot better.  He just now has some residual congestion and cough but even that is slowly getting better each day.  He states that he needs a note clearing him to return to work on Monday.        Review of Systems   Constitutional:  Positive for chills, fever and malaise/fatigue.   HENT:  Positive for congestion and sore throat. Negative for ear pain.    Respiratory:  Positive for cough. Negative for shortness of breath.    Cardiovascular:  Negative for chest pain.   Gastrointestinal:  Positive for nausea and vomiting.   Musculoskeletal:  Positive for myalgias.   Neurological:  Positive for headaches.           PMH:  has no past medical history on file.  MEDS:   Current Outpatient Medications:     albuterol 108 (90 Base) MCG/ACT Aero Soln inhalation aerosol, Inhale 2 Puffs every 6 hours as needed for Shortness of Breath. (Patient not taking: Reported on 11/17/2023), Disp: 8.5 g, Rfl: 0    acetaminophen (TYLENOL) 500 MG Tab, Take 1,000 mg by mouth every 6 hours as needed for Moderate Pain. (Patient not taking: Reported on 11/17/2023), Disp: , Rfl:   ALLERGIES:   Allergies   Allergen Reactions    Trace Metals Rash     Rash     SURGHX: History reviewed. No pertinent surgical history.  SOCHX:  reports that he has quit smoking. He has never used smokeless tobacco. He reports that he does not currently use alcohol. He reports that he does not use drugs.  FH:  "Family history was reviewed, no pertinent findings to report        Objective     /80   Pulse 70   Temp 36.4 °C (97.6 °F) (Temporal)   Resp 16   Ht 1.702 m (5' 7\")   Wt 77.1 kg (170 lb)   SpO2 97%   BMI 26.63 kg/m²      Physical Exam  Constitutional:       Appearance: He is well-developed.   HENT:      Head: Normocephalic and atraumatic.      Right Ear: External ear normal.      Left Ear: External ear normal.   Eyes:      Conjunctiva/sclera: Conjunctivae normal.      Pupils: Pupils are equal, round, and reactive to light.   Cardiovascular:      Rate and Rhythm: Normal rate and regular rhythm.      Heart sounds: Normal heart sounds. No murmur heard.  Pulmonary:      Effort: Pulmonary effort is normal.      Breath sounds: Normal breath sounds. No decreased breath sounds, wheezing, rhonchi or rales.   Musculoskeletal:      Cervical back: Normal range of motion.   Lymphadenopathy:      Cervical: No cervical adenopathy.   Skin:     General: Skin is warm and dry.      Capillary Refill: Capillary refill takes less than 2 seconds.   Neurological:      Mental Status: He is alert and oriented to person, place, and time.   Psychiatric:         Behavior: Behavior normal.         Judgment: Judgment normal.           Assessment & Plan       1. COVID-19 virus infection  Note given stating that he may return to work on Monday.  Continued isolation instructions discussed.  He may also continue supportive care as needed.  - OTC cold/flu medications  -Supportive care also discussed to include the use of saline nasal rinses, steam inhalation, and the use of a cool-mist humidifier in the bedroom at night.  - PO fluids  - Rest      Differential Diagnosis, natural history, and supportive care discussed. Return to the Urgent Care or follow up with your PCP if symptoms fail to resolve, or for any new or worsening symptoms. Emergency room precautions discussed. Patient and/or family appears understanding of information.       "

## 2023-11-17 NOTE — LETTER
November 17, 2023         Patient: Alden Manrique   YOB: 1995   Date of Visit: 11/17/2023           To Whom it May Concern:    Alden Manrique was seen in my clinic on 11/17/2023.  He is cleared to return to work on Monday, 11/20/2023.      Sincerely,           Gayle Hdz P.A.-C.  Electronically Signed

## 2024-03-31 ENCOUNTER — OFFICE VISIT (OUTPATIENT)
Dept: URGENT CARE | Facility: CLINIC | Age: 29
End: 2024-03-31
Payer: COMMERCIAL

## 2024-03-31 VITALS
RESPIRATION RATE: 20 BRPM | HEIGHT: 66 IN | HEART RATE: 60 BPM | WEIGHT: 160 LBS | TEMPERATURE: 97.4 F | BODY MASS INDEX: 25.71 KG/M2 | DIASTOLIC BLOOD PRESSURE: 76 MMHG | OXYGEN SATURATION: 97 % | SYSTOLIC BLOOD PRESSURE: 110 MMHG

## 2024-03-31 DIAGNOSIS — B96.89 ACUTE BACTERIAL SINUSITIS: ICD-10-CM

## 2024-03-31 DIAGNOSIS — J98.8 RTI (RESPIRATORY TRACT INFECTION): ICD-10-CM

## 2024-03-31 DIAGNOSIS — R42 DIZZY: ICD-10-CM

## 2024-03-31 DIAGNOSIS — J01.90 ACUTE BACTERIAL SINUSITIS: ICD-10-CM

## 2024-03-31 RX ORDER — PROMETHAZINE HYDROCHLORIDE 25 MG/1
25 TABLET ORAL EVERY 8 HOURS PRN
Qty: 15 TABLET | Refills: 0 | Status: SHIPPED | OUTPATIENT
Start: 2024-03-31

## 2024-03-31 RX ORDER — MECLIZINE HYDROCHLORIDE 25 MG/1
25 TABLET ORAL 3 TIMES DAILY PRN
Qty: 20 TABLET | Refills: 0 | Status: SHIPPED | OUTPATIENT
Start: 2024-03-31

## 2024-03-31 RX ORDER — AMOXICILLIN AND CLAVULANATE POTASSIUM 875; 125 MG/1; MG/1
1 TABLET, FILM COATED ORAL 2 TIMES DAILY
Qty: 14 TABLET | Refills: 0 | Status: SHIPPED | OUTPATIENT
Start: 2024-03-31 | End: 2024-04-07

## 2024-03-31 RX ORDER — PREDNISONE 20 MG/1
40 TABLET ORAL EVERY MORNING
Qty: 6 TABLET | Refills: 0 | Status: SHIPPED | OUTPATIENT
Start: 2024-03-31 | End: 2024-04-03

## 2024-03-31 ASSESSMENT — ENCOUNTER SYMPTOMS
SINUS PAIN: 1
DIZZINESS: 1
COUGH: 1
SORE THROAT: 1

## 2024-03-31 NOTE — PROGRESS NOTES
Subjective     Hamlet Manrique is a 28 y.o. male who presents with Sinus Problem (7 days ), Cough, Pharyngitis, and Ear Pain (7 days)      - This is a very pleasant 28 y.o. who has come to the walk-in clinic today for approximately 1 week with stuffy runny nose sinus pressure sinus drainage colorful little bit of sore throat and cough.  This morning when woke up and was in bed felt room spinning and had to lay still until it resolved.  Since then if turns or moves quickly gets a room spinning sensation and some nausea.  No recent head trauma or focal limb weakness numbness heaviness.          ALLERGIES:  Trace metals     PMH:  History reviewed. No pertinent past medical history.     PSH:  History reviewed. No pertinent surgical history.    MEDS:    Current Outpatient Medications:     predniSONE (DELTASONE) 20 MG Tab, Take 2 Tablets by mouth every morning for 3 days., Disp: 6 Tablet, Rfl: 0    promethazine (PHENERGAN) 25 MG Tab, Take 1 Tablet by mouth every 8 hours as needed for Nausea/Vomiting (Vertigo)., Disp: 15 Tablet, Rfl: 0    meclizine (ANTIVERT) 25 MG Tab, Take 1 Tablet by mouth 3 times a day as needed for Vertigo., Disp: 20 Tablet, Rfl: 0    amoxicillin-clavulanate (AUGMENTIN) 875-125 MG Tab, Take 1 Tablet by mouth 2 times a day for 7 days., Disp: 14 Tablet, Rfl: 0    albuterol 108 (90 Base) MCG/ACT Aero Soln inhalation aerosol, Inhale 2 Puffs every 6 hours as needed for Shortness of Breath., Disp: 8.5 g, Rfl: 0    ** I have documented what I find to be significant in regards to past medical, social, family and surgical history  in my HPI or under PMH/PSH/FH review section, otherwise it is noncontributory **         HPI    Review of Systems   HENT:  Positive for congestion, ear pain, sinus pain and sore throat.    Respiratory:  Positive for cough.    Neurological:  Positive for dizziness.   All other systems reviewed and are negative.             Objective     /76   Pulse 60   Temp 36.3 °C (97.4 °F)  "(Temporal)   Resp 20   Ht 1.676 m (5' 6\")   Wt 72.6 kg (160 lb)   SpO2 97%   BMI 25.82 kg/m²      Physical Exam  Vitals and nursing note reviewed.   Constitutional:       General: He is not in acute distress.     Appearance: Normal appearance. He is well-developed.   HENT:      Head: Normocephalic.      Right Ear: Tympanic membrane, ear canal and external ear normal. There is no impacted cerumen.      Left Ear: Tympanic membrane, ear canal and external ear normal. There is no impacted cerumen.      Nose: Congestion and rhinorrhea present.      Mouth/Throat:      Mouth: Mucous membranes are moist.      Pharynx: Oropharynx is clear. No oropharyngeal exudate or posterior oropharyngeal erythema.   Cardiovascular:      Heart sounds: Normal heart sounds. No murmur heard.  Pulmonary:      Effort: Pulmonary effort is normal. No respiratory distress.      Breath sounds: Normal breath sounds. No wheezing, rhonchi or rales.   Neurological:      General: No focal deficit present.      Mental Status: He is alert and oriented to person, place, and time.      Motor: No abnormal muscle tone.   Psychiatric:         Mood and Affect: Mood normal.         Behavior: Behavior normal.                             Assessment & Plan     1. RTI (respiratory tract infection)        2. Dizzy  promethazine (PHENERGAN) 25 MG Tab    meclizine (ANTIVERT) 25 MG Tab    Referral to ENT      3. Acute bacterial sinusitis  predniSONE (DELTASONE) 20 MG Tab    amoxicillin-clavulanate (AUGMENTIN) 875-125 MG Tab          - Dx, plan & d/c instructions discussed   - Rest, stay hydrated  - OTC Sudafed/Flonase      Follow up with your regular primary care providers office within a week to keep them updated and informed of this visit and for regular routine health maintenance check-ups. ER if not improving in 2-3 days or if feeling/getting worse. (If you do not have a primary care provider and need to schedule one you may call [facility] at [phone] to do " this).    Patient left in stable condition     POCT results reviewed/discussed    Discussed if any testing, labs or imaging studies are obtained outside of the [facility] facility, it is their responsibility to contact the Urgent Care and let us know that it was done and get us the results so adequate follow up can be initiated    Pertinent prior lab work and/or imaging studies in Epic have been reviewed by me today on day of this visit and taken into account for my treatment and plan today    Pertinent PMH/PSH and/or chronic conditions and medications if any were reviewed today and taken into account for my treatment and plan today    Pertinent prior office visit notes in [facility] have been reviewed by me today on day of this visit.    Please note that this dictation may have been created using voice recognition software, if so I have made every reasonable attempt to correct obvious errors, but I expect that there are errors of grammar and possibly content that I did not discover before finalizing the note.

## 2024-03-31 NOTE — LETTER
March 31, 2024         Patient: Alden Manrique   YOB: 1995   Date of Visit: 3/31/2024           To Whom it May Concern:    Alden Manrique was seen in my clinic on 3/31/2024. He may return to work in 3-4 days.    If you have any questions or concerns, please don't hesitate to call.        Sincerely,           Mor Castorena M.D.  Electronically Signed

## 2025-01-23 ENCOUNTER — OFFICE VISIT (OUTPATIENT)
Dept: URGENT CARE | Facility: CLINIC | Age: 30
End: 2025-01-23
Payer: COMMERCIAL

## 2025-01-23 VITALS
HEIGHT: 67 IN | TEMPERATURE: 98.3 F | OXYGEN SATURATION: 94 % | RESPIRATION RATE: 18 BRPM | HEART RATE: 82 BPM | SYSTOLIC BLOOD PRESSURE: 120 MMHG | WEIGHT: 174 LBS | BODY MASS INDEX: 27.31 KG/M2 | DIASTOLIC BLOOD PRESSURE: 80 MMHG

## 2025-01-23 DIAGNOSIS — R68.89 FLU-LIKE SYMPTOMS: ICD-10-CM

## 2025-01-23 LAB
FLUAV RNA SPEC QL NAA+PROBE: NEGATIVE
FLUBV RNA SPEC QL NAA+PROBE: NEGATIVE
RSV RNA SPEC QL NAA+PROBE: NEGATIVE
SARS-COV-2 RNA RESP QL NAA+PROBE: NEGATIVE

## 2025-01-23 PROCEDURE — 3074F SYST BP LT 130 MM HG: CPT | Performed by: FAMILY MEDICINE

## 2025-01-23 PROCEDURE — 99213 OFFICE O/P EST LOW 20 MIN: CPT | Performed by: FAMILY MEDICINE

## 2025-01-23 PROCEDURE — 3079F DIAST BP 80-89 MM HG: CPT | Performed by: FAMILY MEDICINE

## 2025-01-23 PROCEDURE — 0241U POCT CEPHEID COV-2, FLU A/B, RSV - PCR: CPT | Performed by: FAMILY MEDICINE

## 2025-01-23 ASSESSMENT — ENCOUNTER SYMPTOMS
GASTROINTESTINAL NEGATIVE: 1
SINUS PAIN: 1
CHILLS: 1
EYES NEGATIVE: 1
FEVER: 1
COUGH: 1

## 2025-01-23 NOTE — PROGRESS NOTES
"Subjective:   Alden Manrique is a 29 y.o. male who presents for Cough (X 1 day, cough, head fog, body ache.)      Cough  Associated symptoms include chills and a fever.       Review of Systems   Constitutional:  Positive for chills, fever and malaise/fatigue.   HENT:  Positive for congestion and sinus pain.    Eyes: Negative.    Respiratory:  Positive for cough.    Gastrointestinal: Negative.    Genitourinary: Negative.    Skin: Negative.        Medications, Allergies, and current problem list reviewed today in Epic.     Objective:     /80   Pulse 82   Temp 36.8 °C (98.3 °F) (Temporal)   Resp 18   Ht 1.702 m (5' 7\")   Wt 78.9 kg (174 lb)   SpO2 94%     Physical Exam  Vitals and nursing note reviewed.   Constitutional:       Appearance: Normal appearance.   HENT:      Head: Normocephalic and atraumatic.      Right Ear: Tympanic membrane normal.      Left Ear: Tympanic membrane normal.      Nose: Congestion present.      Mouth/Throat:      Pharynx: Oropharynx is clear.   Eyes:      Pupils: Pupils are equal, round, and reactive to light.   Cardiovascular:      Rate and Rhythm: Normal rate and regular rhythm.      Pulses: Normal pulses.      Heart sounds: Normal heart sounds.   Pulmonary:      Effort: Pulmonary effort is normal.      Breath sounds: Rhonchi present. No wheezing or rales.   Chest:      Chest wall: No tenderness.   Abdominal:      General: Abdomen is flat. Bowel sounds are normal.      Palpations: Abdomen is soft.   Musculoskeletal:      Cervical back: Normal range of motion and neck supple.   Lymphadenopathy:      Cervical: No cervical adenopathy.   Neurological:      Mental Status: He is alert.         Assessment/Plan:     Diagnosis and associated orders:     1. Flu-like symptoms  POCT CEPHEID COV-2, FLU A/B, RSV - PCR         Comments/MDM:              Differential diagnosis, natural history, supportive care, and indications for immediate follow-up discussed.    Advised the patient to " follow-up with the primary care physician for recheck, reevaluation, and consideration of further management.    Please note that this dictation was created using voice recognition software. I have made a reasonable attempt to correct obvious errors, but I expect that there are errors of grammar and possibly content that I did not discover before finalizing the note.    This note was electronically signed by Michael Grijalva M.D.

## 2025-01-23 NOTE — LETTER
[facility]  [address]  [place] [id number]     January 23, 2025    Patient: Alden Manrique   YOB: 1995   Date of Visit: 1/23/2025       To Whom It May Concern:    Alden Manrique was seen and treated in our department on 1/23/2025. Please excuse 1/23-1/24.    Sincerely,     Michael Grijalva M.D.

## 2025-02-20 ENCOUNTER — OFFICE VISIT (OUTPATIENT)
Dept: URGENT CARE | Facility: CLINIC | Age: 30
End: 2025-02-20
Payer: COMMERCIAL

## 2025-02-20 ENCOUNTER — RESULTS FOLLOW-UP (OUTPATIENT)
Dept: URGENT CARE | Facility: CLINIC | Age: 30
End: 2025-02-20

## 2025-02-20 VITALS
RESPIRATION RATE: 18 BRPM | HEIGHT: 66 IN | HEART RATE: 110 BPM | BODY MASS INDEX: 27.97 KG/M2 | OXYGEN SATURATION: 95 % | SYSTOLIC BLOOD PRESSURE: 118 MMHG | TEMPERATURE: 98 F | DIASTOLIC BLOOD PRESSURE: 60 MMHG | WEIGHT: 174 LBS

## 2025-02-20 DIAGNOSIS — Z72.0 VAPES NICOTINE CONTAINING SUBSTANCE: ICD-10-CM

## 2025-02-20 DIAGNOSIS — Z87.891 FORMER CIGARETTE SMOKER: ICD-10-CM

## 2025-02-20 DIAGNOSIS — R09.81 NASAL SINUS CONGESTION: ICD-10-CM

## 2025-02-20 DIAGNOSIS — R06.2 EXPIRATORY WHEEZING: ICD-10-CM

## 2025-02-20 DIAGNOSIS — R05.1 ACUTE COUGH: ICD-10-CM

## 2025-02-20 DIAGNOSIS — J02.9 EXUDATIVE PHARYNGITIS: ICD-10-CM

## 2025-02-20 LAB
FLUAV RNA SPEC QL NAA+PROBE: NEGATIVE
FLUBV RNA SPEC QL NAA+PROBE: NEGATIVE
RSV RNA SPEC QL NAA+PROBE: NEGATIVE
S PYO DNA SPEC NAA+PROBE: NOT DETECTED
SARS-COV-2 RNA RESP QL NAA+PROBE: NEGATIVE

## 2025-02-20 PROCEDURE — 99213 OFFICE O/P EST LOW 20 MIN: CPT

## 2025-02-20 PROCEDURE — 3074F SYST BP LT 130 MM HG: CPT

## 2025-02-20 PROCEDURE — 87651 STREP A DNA AMP PROBE: CPT

## 2025-02-20 PROCEDURE — 99407 BEHAV CHNG SMOKING > 10 MIN: CPT

## 2025-02-20 PROCEDURE — 3078F DIAST BP <80 MM HG: CPT

## 2025-02-20 PROCEDURE — 0241U POCT CEPHEID COV-2, FLU A/B, RSV - PCR: CPT

## 2025-02-20 RX ORDER — BENZONATATE 100 MG/1
100 CAPSULE ORAL 3 TIMES DAILY PRN
Qty: 30 CAPSULE | Refills: 0 | Status: SHIPPED | OUTPATIENT
Start: 2025-02-20 | End: 2025-03-02

## 2025-02-20 RX ORDER — FLUTICASONE PROPIONATE 50 MCG
2 SPRAY, SUSPENSION (ML) NASAL DAILY
Qty: 16 G | Refills: 0 | Status: SHIPPED | OUTPATIENT
Start: 2025-02-20 | End: 2025-03-06

## 2025-02-20 RX ORDER — ALBUTEROL SULFATE 90 UG/1
2 INHALANT RESPIRATORY (INHALATION) EVERY 6 HOURS PRN
Qty: 8.5 G | Refills: 0 | Status: SHIPPED | OUTPATIENT
Start: 2025-02-20

## 2025-02-20 RX ORDER — LIDOCAINE HYDROCHLORIDE 20 MG/ML
SOLUTION OROPHARYNGEAL
Qty: 100 ML | Refills: 0 | Status: SHIPPED | OUTPATIENT
Start: 2025-02-20

## 2025-02-20 RX ORDER — PREDNISONE 10 MG/1
20 TABLET ORAL DAILY
Qty: 6 TABLET | Refills: 0 | Status: SHIPPED | OUTPATIENT
Start: 2025-02-20 | End: 2025-02-23

## 2025-02-20 ASSESSMENT — ENCOUNTER SYMPTOMS
MYALGIAS: 0
STRIDOR: 0
CHILLS: 1
SORE THROAT: 1
SPUTUM PRODUCTION: 0
VOMITING: 0
SINUS PAIN: 0
EYE DISCHARGE: 0
FEVER: 1
BACK PAIN: 0
HEMOPTYSIS: 0
SHORTNESS OF BREATH: 0
NAUSEA: 0
COUGH: 1
WHEEZING: 0

## 2025-02-20 NOTE — PROGRESS NOTES
"Subjective:   Alden Manrique is a 29 y.o. male who presents for Flu Like Symptoms (X 3 days, cough, fever, body aches, sores on back of throat and tongue.)      Patient presents with complaints of cough, intermittent subjective fevers, body aches sore throat with potential \"sores\" on the back of his throat.  Patient states symptoms started about 3 days ago, he does have a sick contact with his boss who had similar symptoms and was evaluated, no known etiology.  Patient does state that he was sick approximately 3 weeks ago with what sounds like upper respiratory infection, states that it was very minimal and only lasted a few days and he has since had complete resolution of his symptoms until the symptoms occurred.  Denies any shortness of breath, audible wheezing, stridor chest pain or difficulty breathing.  Patient does endorse using nicotine vape products as he is trying to cut back on cigarette smoking        Review of Systems   Constitutional:  Positive for chills and fever.   HENT:  Positive for congestion and sore throat. Negative for ear discharge, ear pain and sinus pain.    Eyes:  Negative for discharge.   Respiratory:  Positive for cough. Negative for hemoptysis, sputum production, shortness of breath, wheezing and stridor.    Cardiovascular:  Negative for chest pain.   Gastrointestinal:  Negative for nausea and vomiting.   Genitourinary: Negative.    Musculoskeletal:  Negative for back pain and myalgias.   Skin:  Negative for rash.     Refer to HPI for additional details.    During this visit, appropriate PPE was worn, and hand hygiene was performed.    PMH:  has no past medical history on file.    MEDS:   Current Outpatient Medications:     predniSONE (DELTASONE) 10 MG Tab, Take 2 Tablets by mouth every day for 3 days., Disp: 6 Tablet, Rfl: 0    lidocaine (XYLOCAINE) 2 % Solution, 5mL orally, may be applied as a swish and spit up to three times daily as needed for throat pain, Disp: 100 mL, Rfl: " "0    benzonatate (TESSALON) 100 MG Cap, Take 1 Capsule by mouth 3 times a day as needed for Cough for up to 10 days., Disp: 30 Capsule, Rfl: 0    fluticasone (FLONASE ALLERGY RELIEF) 50 MCG/ACT nasal spray, Administer 2 Sprays into affected nostril(S) every day for 14 days., Disp: 16 g, Rfl: 0    albuterol 108 (90 Base) MCG/ACT Aero Soln inhalation aerosol, Inhale 2 Puffs every 6 hours as needed for Shortness of Breath., Disp: 8.5 g, Rfl: 0    promethazine (PHENERGAN) 25 MG Tab, Take 1 Tablet by mouth every 8 hours as needed for Nausea/Vomiting (Vertigo). (Patient not taking: Reported on 2/20/2025), Disp: 15 Tablet, Rfl: 0    meclizine (ANTIVERT) 25 MG Tab, Take 1 Tablet by mouth 3 times a day as needed for Vertigo. (Patient not taking: Reported on 2/20/2025), Disp: 20 Tablet, Rfl: 0    ALLERGIES:   Allergies   Allergen Reactions    Trace Metals Rash     Rash     SURGHX: History reviewed. No pertinent surgical history.  SOCHX:  reports that he has quit smoking. He has never used smokeless tobacco. He reports that he does not currently use alcohol. He reports that he does not use drugs.    FH: Per HPI as applicable/pertinent.    Medications, Allergies, and current problem list reviewed today in Epic.     Objective:     /60   Pulse (!) 110   Temp 36.7 °C (98 °F) (Temporal)   Resp 18   Ht 1.676 m (5' 6\")   Wt 78.9 kg (174 lb)   SpO2 95%     Physical Exam  Constitutional:       General: He is not in acute distress.     Appearance: Normal appearance. He is ill-appearing. He is not toxic-appearing or diaphoretic.   HENT:      Head: Normocephalic and atraumatic.      Right Ear: Tympanic membrane, ear canal and external ear normal.      Left Ear: Tympanic membrane, ear canal and external ear normal.      Nose: Congestion and rhinorrhea present.      Mouth/Throat:      Mouth: Mucous membranes are dry.      Pharynx: Oropharyngeal exudate and posterior oropharyngeal erythema present.   Eyes:      " Conjunctiva/sclera: Conjunctivae normal.   Cardiovascular:      Rate and Rhythm: Normal rate.   Pulmonary:      Effort: Pulmonary effort is normal. No respiratory distress.      Breath sounds: No stridor. Examination of the right-upper field reveals wheezing. Examination of the left-upper field reveals wheezing. Examination of the right-middle field reveals wheezing. Examination of the left-middle field reveals wheezing. Examination of the right-lower field reveals wheezing. Examination of the left-lower field reveals wheezing. Wheezing present. No rhonchi or rales.      Comments: Mild expiratory wheezes heard scattered throughout lung fields.  No other adventitious breath sounds patient is eupneic in no respiratory distress  Chest:      Chest wall: No tenderness.   Musculoskeletal:      Cervical back: Normal range of motion. No tenderness.   Lymphadenopathy:      Cervical: No cervical adenopathy.   Neurological:      Mental Status: He is alert.         Assessment/Plan:     Diagnosis and associated orders:     1. Exudative pharyngitis  - lidocaine (XYLOCAINE) 2 % Solution; 5mL orally, may be applied as a swish and spit up to three times daily as needed for throat pain  Dispense: 100 mL; Refill: 0  - POCT CEPHEID GROUP A STREP - PCR  - POCT CEPHEID COV-2, FLU A/B, RSV - PCR    2. Acute cough  - predniSONE (DELTASONE) 10 MG Tab; Take 2 Tablets by mouth every day for 3 days.  Dispense: 6 Tablet; Refill: 0  - benzonatate (TESSALON) 100 MG Cap; Take 1 Capsule by mouth 3 times a day as needed for Cough for up to 10 days.  Dispense: 30 Capsule; Refill: 0  - POCT CEPHEID COV-2, FLU A/B, RSV - PCR  - albuterol 108 (90 Base) MCG/ACT Aero Soln inhalation aerosol; Inhale 2 Puffs every 6 hours as needed for Shortness of Breath.  Dispense: 8.5 g; Refill: 0    3. Nasal sinus congestion  - fluticasone (FLONASE ALLERGY RELIEF) 50 MCG/ACT nasal spray; Administer 2 Sprays into affected nostril(S) every day for 14 days.  Dispense: 16  g; Refill: 0    4. Expiratory wheezing  - albuterol 108 (90 Base) MCG/ACT Aero Soln inhalation aerosol; Inhale 2 Puffs every 6 hours as needed for Shortness of Breath.  Dispense: 8.5 g; Refill: 0    5. Vapes nicotine containing substance    6. Former cigarette smoker     Comments/MDM:     Patient history and physical exam acute exudative pharyngitis with concomitant cough, nasal congestion and expiratory wheezes.  Patient is ill-appearing though nontoxic in clinic with no red flag symptoms endorse were found on physical exam  Discussed HPI and physical exam findings with patient, does appear that he had complete resolution of symptoms for multiple weeks before this illness, I do have low suspicion for superimposed infection.  For this reason did advise doing strep and viral testing especially given patient's throat exam showing exudative pharyngitis/tonsillitis.  Did also recommend supportive treatment for symptoms as well as albuterol for mild wheezing which patient endorses happening to him frequently when he is sick.  Patient on board with plan of care  Strep test negative  Viral test negative  Outpatient management will consist of copious fluids and hydration, staggered Tylenol and ibuprofen, albuterol as needed for wheezing, viscous lidocaine gargle and warm salt water gargles as needed for throat pain, short course prednisone for cough and inflammation as well as wheezing, benzonatate as needed for persistent cough, Flonase for nasal decongestion, monitor symptoms  Follow up in 3-5 days if no improvement in symptoms    I spent 13 minutes discussing patient's tobacco use.  Patient endorses using nicotine vape products and is a former cigarette smoker  They are trying to reduce their nicotine intake  By using lower milligram vape cartridges  I discussed various health concerns relating to tobacco use including current symptoms, hypertension, cancer, pulmonary/cardiovascular disease, as well as secondhand  exposure risks.  I discussed potential cessation options such as tobacco/nicotine replacement products, cessation and reduction of tobacco intake, discussing with PCP about potential medication use, as well as psychotherapy use.           Differential diagnosis, natural history, supportive care, and indications for immediate follow-up discussed.    Advised the patient to follow-up with the primary care physician for recheck, reevaluation, and consideration of further management.    Please note that this dictation was created using voice recognition software. I have made a reasonable attempt to correct obvious errors, but I expect that there are errors of grammar and possibly content that I did not discover before finalizing the note.    This note was electronically signed by FINA Vega

## 2025-02-20 NOTE — LETTER
February 20, 2025    To Whom It May Concern:         This is confirmation that Alden Manrique attended his scheduled appointment with FINA Vega on 2/20/25.  He may return to work on 2/22/2025         If you have any questions please do not hesitate to call me at the phone number listed below.    Sincerely,          BRIAN Vega.  [phone]